# Patient Record
Sex: FEMALE | Race: OTHER
[De-identification: names, ages, dates, MRNs, and addresses within clinical notes are randomized per-mention and may not be internally consistent; named-entity substitution may affect disease eponyms.]

---

## 2019-02-28 ENCOUNTER — HOSPITAL ENCOUNTER (EMERGENCY)
Dept: HOSPITAL 25 - UCEAST | Age: 64
Discharge: HOME | End: 2019-02-28
Payer: COMMERCIAL

## 2019-02-28 VITALS — SYSTOLIC BLOOD PRESSURE: 100 MMHG | DIASTOLIC BLOOD PRESSURE: 57 MMHG

## 2019-02-28 DIAGNOSIS — Z91.09: ICD-10-CM

## 2019-02-28 DIAGNOSIS — B30.9: Primary | ICD-10-CM

## 2019-02-28 PROCEDURE — G0463 HOSPITAL OUTPT CLINIC VISIT: HCPCS

## 2019-02-28 PROCEDURE — 99212 OFFICE O/P EST SF 10 MIN: CPT

## 2019-02-28 NOTE — UC
Eye Complaint HPI





- HPI Summary


HPI Summary: 





few days of L eye crusting w/ no pain or vision changes.  denies uri symptoms 

or eye pain.





- History of Current Complaint


Chief Complaint: UCEye


Stated Complaint: EYE IRRITATION


Time Seen by Provider: 02/28/19 15:32


Hx Obtained From: Patient


Hx Last Menstrual Period: POST


Onset/Duration: Sudden Onset


Pain Intensity: 2


Pain Scale Used: 0-10 Numeric


Location of Injury: Conjunctiva


Associated Signs And Symptoms: Positive: Drainage (Clear).  Negative: 

Photophobia, Vision Impairment Left





- Allergies/Home Medications


Allergies/Adverse Reactions: 


 Allergies











Allergy/AdvReac Type Severity Reaction Status Date / Time


 


METAL* Allergy Intermediate Rash Uncoded 02/28/19 15:20











Home Medications: 


 Home Medications





Cholecalciferol TAB* [Vitamin D TAB*] 1,000 mcg PO DAILY 02/28/19 [History 

Confirmed 02/28/19]











PMH/Surg Hx/FS Hx/Imm Hx


Previously Healthy: Yes





- Surgical History


Surgical History: None





- Social History


Alcohol Use: None


Substance Use Type: None


Smoking Status (MU): Never Smoked Tobacco





Review of Systems


All Other Systems Reviewed And Are Negative: Yes


Constitutional: Positive: Negative


Skin: Positive: Negative


Eyes: Positive: Drainage - L.  Negative: Blurred Vision, Eye Redness


ENT: Negative: Sore Throat


Neurological: Negative: Headache





Physical Exam


Triage Information Reviewed: Yes


Appearance: Well-Appearing


Vital Signs: 


 Initial Vital Signs











Temp  97.6 F   02/28/19 15:10


 


Pulse  68   02/28/19 15:10


 


Resp  16   02/28/19 15:10


 


BP  100/57   02/28/19 15:10


 


Pulse Ox  100   02/28/19 15:10











Vital Signs Reviewed: Yes


Eyes: Positive: Conjunctiva Clear, Other: - minimal crusting in L eyelashes.  

PERRLA BILAT.  Negative: Conjunctiva Inflamed, Discharge





Eye Complaint Course/Dx





- Course


Course Of Treatment: Mild case of viral conjunctivitis.  No vision impairment.  

will rx antibx ointment to cover for bacterial source although this does not 

appear to be case.





- Differential Dx/Diagnosis


Differential Diagnosis/HQI/PQRI: Conjunctivitis, Uveitis


Provider Diagnosis: 


 Viral conjunctivitis of left eye








Discharge





- Sign-Out/Discharge


Documenting (check all that apply): Patient Departure


All imaging exams completed and their final reports reviewed: No Studies





- Discharge Plan


Condition: Good


Disposition: HOME


Prescriptions: 


Erythromycin OPTH OINT* [Erythromycin 0.5% OPTH OINT*] 1 applic LEFT EYE TID 5 

Days #1 ophth.oint


Patient Education Materials:  Conjunctivitis (ED)


Forms:  *Work Release


Referrals: 


Matt Romeo MD [Primary Care Provider] - 





- Billing Disposition and Condition


Condition: GOOD


Disposition: Home